# Patient Record
Sex: FEMALE | Race: BLACK OR AFRICAN AMERICAN | NOT HISPANIC OR LATINO | Employment: OTHER | URBAN - METROPOLITAN AREA
[De-identification: names, ages, dates, MRNs, and addresses within clinical notes are randomized per-mention and may not be internally consistent; named-entity substitution may affect disease eponyms.]

---

## 2025-04-29 ENCOUNTER — HOSPITAL ENCOUNTER (EMERGENCY)
Facility: HOSPITAL | Age: 70
Discharge: HOME | End: 2025-04-29
Attending: EMERGENCY MEDICINE
Payer: COMMERCIAL

## 2025-04-29 ENCOUNTER — APPOINTMENT (OUTPATIENT)
Dept: RADIOLOGY | Facility: HOSPITAL | Age: 70
End: 2025-04-29
Payer: COMMERCIAL

## 2025-04-29 VITALS
OXYGEN SATURATION: 96 % | DIASTOLIC BLOOD PRESSURE: 81 MMHG | SYSTOLIC BLOOD PRESSURE: 142 MMHG | HEART RATE: 75 BPM | TEMPERATURE: 98.1 F | HEIGHT: 68 IN | BODY MASS INDEX: 37.54 KG/M2 | RESPIRATION RATE: 18 BRPM
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
## 2025-04-29 PROCEDURE — 70450 CT HEAD/BRAIN W/O DYE: CPT

## 2025-04-29 PROCEDURE — 2500000004 HC RX 250 GENERAL PHARMACY W/ HCPCS (ALT 636 FOR OP/ED): Mod: JZ | Performed by: EMERGENCY MEDICINE

## 2025-04-29 PROCEDURE — 96372 THER/PROPH/DIAG INJ SC/IM: CPT | Performed by: EMERGENCY MEDICINE

## 2025-04-29 PROCEDURE — 73110 X-RAY EXAM OF WRIST: CPT | Mod: RT

## 2025-04-29 PROCEDURE — 99284 EMERGENCY DEPT VISIT MOD MDM: CPT | Mod: 25 | Performed by: EMERGENCY MEDICINE

## 2025-04-29 PROCEDURE — 70486 CT MAXILLOFACIAL W/O DYE: CPT

## 2025-04-29 PROCEDURE — 73564 X-RAY EXAM KNEE 4 OR MORE: CPT | Mod: RIGHT SIDE | Performed by: RADIOLOGY

## 2025-04-29 PROCEDURE — 73564 X-RAY EXAM KNEE 4 OR MORE: CPT | Mod: RT

## 2025-04-29 PROCEDURE — 72125 CT NECK SPINE W/O DYE: CPT

## 2025-04-29 PROCEDURE — 73130 X-RAY EXAM OF HAND: CPT | Mod: RT

## 2025-04-29 PROCEDURE — 76377 3D RENDER W/INTRP POSTPROCES: CPT

## 2025-04-29 RX ORDER — FENTANYL CITRATE 50 UG/ML
25 INJECTION, SOLUTION INTRAMUSCULAR; INTRAVENOUS ONCE
Status: COMPLETED | OUTPATIENT
Start: 2025-04-29 | End: 2025-04-29

## 2025-04-29 RX ORDER — TRAMADOL HYDROCHLORIDE 50 MG/1
50 TABLET ORAL EVERY 6 HOURS PRN
Qty: 10 TABLET | Refills: 0 | Status: SHIPPED | OUTPATIENT
Start: 2025-04-29 | End: 2025-05-01

## 2025-04-29 RX ADMIN — FENTANYL CITRATE 25 MCG: 0.05 INJECTION, SOLUTION INTRAMUSCULAR; INTRAVENOUS at 18:45

## 2025-04-29 ASSESSMENT — PAIN DESCRIPTION - LOCATION
LOCATION: HEAD
LOCATION: JAW

## 2025-04-29 ASSESSMENT — PAIN SCALES - GENERAL
PAINLEVEL_OUTOF10: 7
PAINLEVEL_OUTOF10: 8
PAINLEVEL_OUTOF10: 7

## 2025-04-29 ASSESSMENT — PAIN DESCRIPTION - FREQUENCY: FREQUENCY: CONSTANT/CONTINUOUS

## 2025-04-29 ASSESSMENT — PAIN DESCRIPTION - ORIENTATION: ORIENTATION: LEFT

## 2025-04-29 ASSESSMENT — PAIN - FUNCTIONAL ASSESSMENT
PAIN_FUNCTIONAL_ASSESSMENT: 0-10

## 2025-04-29 ASSESSMENT — PAIN DESCRIPTION - PAIN TYPE: TYPE: ACUTE PAIN

## 2025-04-29 ASSESSMENT — PAIN DESCRIPTION - DESCRIPTORS: DESCRIPTORS: ACHING;THROBBING;TENDER

## 2025-04-29 NOTE — ED TRIAGE NOTES
Pt to ed from home due to fall. States she was walking in her apartment when she tripped and fell. Denies any dizziness or light headedness before falling. No n/v/, sob or chest pain. Denies loc, no blood thinners. Hx htn and aneursym. Endorses left jaw and right knee pain

## 2025-04-29 NOTE — ED PROVIDER NOTES
HPI   Chief Complaint   Patient presents with    Fall       HPI  Patient is a 69-year-old female with a past medical history significant for hypertension, hypercholesterolemia, aneurysm who presented to the emergency room with a chief complaint of mechanical fall.  Patient states that she was in her usual state of health walking down the hallway in her building when she tripped on the rug and landed on her face.  She did not lose consciousness.  She endorses pain to the left jaw.  She did not have her dentures in place and denies any intraoral lesions.  She has no neurologic deficits and denies any vomiting.  She denies any severe headache.  She has pain to the right hand where she scraped it at the base of the palm and the right knee where she landed.  Denies any other issues at this time.  She is not anticoagulated.      PMHx: As above  PSHx: Denies pertinent  FamilyHx: Denies  SocialHx: Denies   Allergies: NKDA  Medications: See Medication Reconciliation     ROS  As above otherwise denies    Physical Exam    GENERAL: Awake and Alert, No Acute Distress  HEENT: AT/NC, PERRL, EOMI, Normal Oropharynx, No Signs of Dehydration  NECK: Normal Inspection, No JVD  CARDIOVASCULAR: RRR, No M/R/G  RESPIRATORY: CTA Bilaterally, No Wheezes, Rales or Rhonchi, Chest Wall Non-tender  ABDOMEN: Soft, non-tender abdomen, Normal Bowel Sounds, No Distention  BACK: No CVA Tenderness  SKIN: Small superficial abrasion to the base of the palm on the right hand with full range of motion of the wrist and good distal pulse motor or sensory function.  Right knee has some tenderness to palpation overlying the patella and good range of motion as well as good distal pulse motor and sensory function.  Normal Color, Warm, Dry, No Rashes   EXTREMITIES: Non-Tender, Full ROM, No Pedal Edema  NEURO: A&O x 3, Normal Motor and Sensation, Normal Mood and Affect    Nursing Assessment and Vitals Reviewed    Medical Decision  Patient is a 69-year-old female  with a past medical history significant for hypertension, hypercholesterolemia, aneurysm who presented to the emergency room with a chief complaint of mechanical fall.  Patient states that she was in her usual state of health walking down the hallway in her building when she tripped on the rug and landed on her face.  She did not lose consciousness.  She endorses pain to the left jaw.  She did not have her dentures in place and denies any intraoral lesions.  She has no neurologic deficits and denies any vomiting.  She denies any severe headache.  She has pain to the right hand where she scraped it at the base of the palm and the right knee where she landed.  Denies any other issues at this time.  She is not anticoagulated.  She endorses a Tdap in the last couple years and her hand wound is not significant.    On evaluation she is well-appearing and in no acute distress.  Lungs are clear and heart is regular.  She has no C, T, L-spine tenderness to palpation.  She is neurologically intact.  No intraoral lesions are appreciated but she does have some pain overlying the left TMJ.  Small superficial abrasion to the base of the palm on the right hand with full range of motion of the wrist and good distal pulse motor or sensory function.  Right knee has some tenderness to palpation overlying the patella and good range of motion as well as good distal pulse motor and sensory function.  Patient is given fentanyl for pain and will obtain imaging.    Workup for patient including a CT of the head that did not reveal any intracranial pathology.  She does have a notable aneurysm coil which is known.  CT C-spine did not show any malalignment or traumatic injury.  It did show demineralization.  CT facial bones was unremarkable.  X-ray of the hand showed demineralization but no fracture or dislocation.  X-ray of the knee was unremarkable.  On reevaluation she is very well-appearing and in no acute distress.  She remains  neurologically intact without any significant traumatic findings on exam.  She is discharged home in stable condition with education on supportive care at home as well as signs and symptoms that should prompt immediate turn to emergency room.                Patient History   Medical History[1]  Surgical History[2]  Family History[3]  Social History[4]    Physical Exam   ED Triage Vitals   Temperature Heart Rate Respirations BP   25 1800 25 1800 25 1800 25 1800   37.1 °C (98.7 °F) 73 20 (!) 177/155      Pulse Ox Temp Source Heart Rate Source Patient Position   25 1800 25 1800 25 1800 25 1800   98 % Oral Monitor Lying      BP Location FiO2 (%)     25 1800 --     Right arm        Physical Exam      ED Course & MDM   Diagnoses as of 25   Fall, initial encounter                 No data recorded                                 Medical Decision Making      Procedure  Procedures       [1]   Past Medical History:  Diagnosis Date    Personal history of other endocrine, nutritional and metabolic disease     History of obesity    Pure hypercholesterolemia, unspecified 2016    High cholesterol   [2]   Past Surgical History:  Procedure Laterality Date     SECTION, CLASSIC  2016     Section    CT HEAD ANGIO W AND WO IV CONTRAST  2020    CT HEAD ANGIO W AND WO IV CONTRAST 2020 [facility]    KIDNEY SURGERY  2016    Kidney Surgery    OTHER SURGICAL HISTORY  2016    Abdominal Surgery   [3] No family history on file.  [4]   Social History  Tobacco Use    Smoking status: Not on file    Smokeless tobacco: Not on file   Substance Use Topics    Alcohol use: Not on file    Drug use: Not on file        Tahira Bermudez MD  25

## 2025-04-30 NOTE — DISCHARGE INSTRUCTIONS
Please make sure to follow-up closely with your primary care doctor.  You may take Tylenol for pain or discomfort.  Return immediately if concerning symptoms, as discussed.

## 2025-05-01 RX ORDER — TRAMADOL HYDROCHLORIDE 50 MG/1
50 TABLET ORAL EVERY 6 HOURS PRN
Qty: 10 TABLET | Refills: 0 | Status: SHIPPED | OUTPATIENT
Start: 2025-05-01 | End: 2025-05-04

## 2025-05-01 NOTE — PROGRESS NOTES
I was called by the pharmacy to update location for her prescription for tramadol prescribed by Dr. Navarrete. I cancelled original prescription and changed the pharmacy to the requested location. [facility] reviewed.     Tatiana Serna MD